# Patient Record
Sex: FEMALE
[De-identification: names, ages, dates, MRNs, and addresses within clinical notes are randomized per-mention and may not be internally consistent; named-entity substitution may affect disease eponyms.]

---

## 2023-04-05 ENCOUNTER — RESULT REVIEW (OUTPATIENT)
Age: 68
End: 2023-04-05

## 2023-04-05 ENCOUNTER — APPOINTMENT (OUTPATIENT)
Dept: ORTHOPEDIC SURGERY | Facility: CLINIC | Age: 68
End: 2023-04-05
Payer: MEDICARE

## 2023-04-05 VITALS
OXYGEN SATURATION: 98 % | SYSTOLIC BLOOD PRESSURE: 155 MMHG | WEIGHT: 109 LBS | BODY MASS INDEX: 20.32 KG/M2 | TEMPERATURE: 97.2 F | DIASTOLIC BLOOD PRESSURE: 97 MMHG | HEIGHT: 61.5 IN | HEART RATE: 85 BPM | RESPIRATION RATE: 18 BRPM

## 2023-04-05 DIAGNOSIS — M25.462 EFFUSION, LEFT KNEE: ICD-10-CM

## 2023-04-05 DIAGNOSIS — Z78.9 OTHER SPECIFIED HEALTH STATUS: ICD-10-CM

## 2023-04-05 DIAGNOSIS — Z82.5 FAMILY HISTORY OF ASTHMA AND OTHER CHRONIC LOWER RESPIRATORY DISEASES: ICD-10-CM

## 2023-04-05 DIAGNOSIS — Z80.8 FAMILY HISTORY OF MALIGNANT NEOPLASM OF OTHER ORGANS OR SYSTEMS: ICD-10-CM

## 2023-04-05 DIAGNOSIS — Z87.39 PERSONAL HISTORY OF OTHER DISEASES OF THE MUSCULOSKELETAL SYSTEM AND CONNECTIVE TISSUE: ICD-10-CM

## 2023-04-05 PROBLEM — Z00.00 ENCOUNTER FOR PREVENTIVE HEALTH EXAMINATION: Status: ACTIVE | Noted: 2023-04-05

## 2023-04-05 PROCEDURE — 99204 OFFICE O/P NEW MOD 45 MIN: CPT | Mod: 25

## 2023-04-05 PROCEDURE — 20610 DRAIN/INJ JOINT/BURSA W/O US: CPT | Mod: LT

## 2023-04-05 NOTE — HISTORY OF PRESENT ILLNESS
[de-identified] : Romy is a 67 year old female presenting for an evaluation of the Left knee pain with effusion for 1 day w/o injury. Normal knee prior to date. Pain is diffused, not able to bend, bear weight. She had a sx in 1983 on her left leg, had a pain in her foot, worried about Ca, states she does not have feeling in the leg therefore does not feel the pain. Her left knee surgery was for what she thought was a Hamartoma and they removed a significant portion of her medial quadriceps which has left her weaker on that leg. She was here with her  for his knee evaluation but was limping severely and could not straighten her knee all the way and could not bend it more than 45 degrees. I offered her the option of draining her knee to see if I could give her some relief and then we scheduled xrays tomorrow when she had more time to investigate the problem.

## 2023-04-05 NOTE — PHYSICAL EXAM
[de-identified] : Left Knee/Lower Extremity: \par \par Skin: Long medial knee arthrotomy healed incision with a deficient vastus medialis muscle.\par No erythema, no ecchymosis, no abrasions, no scratches, no tattoos.  \par               \par \par Knee Joint Swelling: severe effusion.	\par \par Popliteal Swelling: None. 	\par \par Pre-Patella Bursa Swelling: None. \par \par Alignment: Neutral \par \par ROM Extension:10 degrees.   \par ROM Flexion:  45 degrees.   \par \par Knee Joint Line Tenderness: \par tender at medial joint line.   \par tender at the lateral joint line.   \par tender in the patellofemoral compartment. \par \par Soft Tissue Tenderness:  the whole knee was tender.\par \par Patella Compression Test: painful to touch.\par \par Patellofemoral Crepitus: None.   \par \par Patellofemoral Apprehension Testing: Painful to test anything.\par \par Patellofemoral Laxity: tense effusion so not testable.\par \par Medial Collateral Ligament Laxity: Good endpoint.                                                      \par \par Lateral Collateral Ligament Laxity:  Good endpoint. \par \par ACL Testing: Stable ACL. Good Endpoint after fluid drained.\par \par \par PCL Testing: 			\par Stable PCL. Good Endpoint after fluid drained.                                                              \par Posterior Drawer Test: Negative \par \par Motor Strength: after fluid removed.		\par Quadriceps strength is 4 out of 5 \par Hamstring strength is 5 out of 5 \par Ankle dorsiflexion strength is 5 out of 5 \par Ankle plantarflexion strength is 5 out of 5 \par \par Sensation: 		\par Light tough sensation around the knee was decreased in several areas from previous surgery.\par \par Pulses: 				\par Pulses are palpable at the ankle at the Dorsalis Pedis Artery.  \par Pulses are palpable at the Posterior Tibialis Artery. \par \par \par \par Gait:  Very bad limping gait before fluid drained. improved limping gait after fluid removed.\par \par Assistive Devices: 	None \par  [de-identified] : None taken today. Patient will return tomorrow for xrays. She had no time today.

## 2023-04-05 NOTE — PROCEDURE
[de-identified] : Knee Joint Aspiration, Left: Verbal consent was obtained from the patient for a knee aspiration after the risks and benefits were discussed. The patient was made comfortable and the patient’s right knee was then sterilely prepped. Local anesthetic was used to desensitize the skin prior to the aspiration. With the skin anesthetized, an 18-gauge needle was introduced through a superior lateral approach to the knee joint just above the level of the patella. The knee was then aspirated. A sterile band-aid was placed over the aspiration site and pressure was applied to the injection site for several minutes to help with hemostasis as the needle site.\par \par 50 ml of clear joint fluid ) was removed from the knee joint into the syringe.\par \par

## 2023-04-05 NOTE — REASON FOR VISIT
[Initial Visit] : an initial visit for [FreeTextEntry2] : Left knee Pain and swelling and having difficulty walking.

## 2023-04-05 NOTE — DISCUSSION/SUMMARY
[de-identified] : Impression:\par Left knee effusion severe with difficulty walking and with motion.\par Left knee aspirated 50cc of clear yellow joint fluid with significant pain relief\par No xrays today.\par History of Hamartoma removal in 1980s with removal of vastus medialis muscle partially.\par \par Plan:\par She will return tomorrow for knee xrays to see if this is more of an arthritic cause to her effusion.\par If these give no reason, then advanced imaging may be considered.

## 2023-04-10 ENCOUNTER — APPOINTMENT (OUTPATIENT)
Dept: ORTHOPEDIC SURGERY | Facility: CLINIC | Age: 68
End: 2023-04-10
Payer: MEDICARE

## 2023-04-10 ENCOUNTER — TRANSCRIPTION ENCOUNTER (OUTPATIENT)
Age: 68
End: 2023-04-10

## 2023-04-10 ENCOUNTER — OUTPATIENT (OUTPATIENT)
Dept: OUTPATIENT SERVICES | Facility: HOSPITAL | Age: 68
LOS: 1 days | End: 2023-04-10
Payer: MEDICARE

## 2023-04-10 PROCEDURE — 77073 BONE LENGTH STUDIES: CPT

## 2023-04-10 PROCEDURE — 99212 OFFICE O/P EST SF 10 MIN: CPT

## 2023-04-10 PROCEDURE — 77073 BONE LENGTH STUDIES: CPT | Mod: 26

## 2023-04-10 PROCEDURE — 73564 X-RAY EXAM KNEE 4 OR MORE: CPT

## 2023-04-10 PROCEDURE — 73564 X-RAY EXAM KNEE 4 OR MORE: CPT | Mod: 26,50,59

## 2023-04-10 PROCEDURE — 73565 X-RAY EXAM OF KNEES: CPT

## 2023-04-10 NOTE — PHYSICAL EXAM
[de-identified] : Left Knee/Lower Extremity: \par \par Skin: Long medial knee arthrotomy healed incision with a deficient vastus medialis muscle.\par No erythema, no ecchymosis, no abrasions, no scratches, no tattoos.  \par               \par \par Knee Joint Swelling: minimal effusion.	\par \par Popliteal Swelling: None. 	\par \par Pre-Patella Bursa Swelling: None. \par \par Alignment: Neutral to mild valgus \par \par ROM Extension:4 degrees.   \par ROM Flexion:  110+ degrees.   \par \par Knee Joint Line Tenderness: \par tender at the lateral joint line.   \par \par Patellofemoral Crepitus: None.   \par \par Medial Collateral Ligament Laxity: Good endpoint.                                                      \par \par Lateral Collateral Ligament Laxity:  Good endpoint. \par \par ACL Testing: Stable ACL. Good Endpoint after fluid drained.\par \par \par PCL Testing: 			\par Stable PCL. Good Endpoint                                                        \par Posterior Drawer Test: Negative \par \par Sensation: 		\par Light tough sensation around the knee was decreased in several areas from previous surgery.\par \par Pulses: 				\par Pulses are palpable at the ankle at the Dorsalis Pedis Artery.  \par Pulses are palpable at the Posterior Tibialis Artery. \par \par Gait:  much improved since last visit with no cane\par \par Assistive Devices: 	None \par  [de-identified] : X-ray of the Left Knee:\par \par AP Standing View:\par Lateral joint space loss. mild with lateral osteophytes.\par Medial joint space preserved.\par \par PA Flexion View:\par Lateral joint space loss. Severe. Bone on Bone.\par Medial joint space preserved.\par \par Lateral View: \par Patellofemoral joint space preserved.\par \par [place] View: \par Patellofemoral joint space is preserved.\par Patellofemoral joint central tracking.\par Osteophytes seen on lateral femoral condyle\par \par Bilateral Hip to Ankle Standing View:\par Alignment: Valgus\par Lateral joint space loss. mild.\par Hips: No significant changes\par \par

## 2023-04-10 NOTE — DISCUSSION/SUMMARY
[de-identified] : Impression:\par Left knee effusion mild\par History of Hamartoma removal in 1980s with removal of vastus medialis muscle partially.\par Lateral compartment OA with severe joint space loss on the flexion view.\par \par Plan:\par We discussed the non surgical and surgical options today.\par She is fairly comfortable and not treatment was requested for right now.\par She understands she will need a partial or total knee replacement in the future but right now she is tolerating the knee arthrtis.

## 2023-04-10 NOTE — HISTORY OF PRESENT ILLNESS
[de-identified] : Romy is a 67 year old female presenting for a followup evaluation of the Left knee effusion. She reports after 50cc of clear fluid, was aspirated from the knee and states it feels better however had a reaction due to one of her medication intakes. \par She had xrays taken today because she did not have time last visit.\par We reviewed the xrays and discussed her knee condition.

## 2023-04-27 ENCOUNTER — APPOINTMENT (OUTPATIENT)
Dept: ORTHOPEDIC SURGERY | Facility: CLINIC | Age: 68
End: 2023-04-27
Payer: MEDICARE

## 2023-04-27 VITALS — HEIGHT: 61.5 IN | WEIGHT: 109 LBS | BODY MASS INDEX: 20.32 KG/M2 | RESPIRATION RATE: 16 BRPM

## 2023-04-27 DIAGNOSIS — M19.032 PRIMARY OSTEOARTHRITIS, LEFT WRIST: ICD-10-CM

## 2023-04-27 DIAGNOSIS — F48.8 OTHER SPECIFIED NONPSYCHOTIC MENTAL DISORDERS: ICD-10-CM

## 2023-04-27 PROCEDURE — 99214 OFFICE O/P EST MOD 30 MIN: CPT

## 2023-04-27 PROCEDURE — 73110 X-RAY EXAM OF WRIST: CPT | Mod: 50

## 2023-06-19 ENCOUNTER — APPOINTMENT (OUTPATIENT)
Dept: ORTHOPEDIC SURGERY | Facility: CLINIC | Age: 68
End: 2023-06-19
Payer: MEDICARE

## 2023-06-19 VITALS
HEART RATE: 64 BPM | HEIGHT: 61.5 IN | OXYGEN SATURATION: 100 % | SYSTOLIC BLOOD PRESSURE: 146 MMHG | DIASTOLIC BLOOD PRESSURE: 96 MMHG | WEIGHT: 109 LBS | RESPIRATION RATE: 18 BRPM | BODY MASS INDEX: 20.32 KG/M2

## 2023-06-19 DIAGNOSIS — M25.562 PAIN IN LEFT KNEE: ICD-10-CM

## 2023-06-19 PROCEDURE — 20610 DRAIN/INJ JOINT/BURSA W/O US: CPT | Mod: LT

## 2023-06-19 NOTE — HISTORY OF PRESENT ILLNESS
[de-identified] : Romy is a 67 year old female who presents today with left knee pain. She is here for an injection. She has left knee lateral compartment osteoarthritis that is moderate to severely worn down on the lateral side with osteophytes on her xrays and especially the flexion weight bearing view. She will be going to [facility] with her  for two months starting this coming weekend.

## 2023-06-19 NOTE — DISCUSSION/SUMMARY
[de-identified] : Impression:\par Left knee lateral compartment OA.\par History of Hamartoma removal in 1980s with removal of vastus medialis muscle partially.\par Lateral compartment OA with severe joint space loss on the flexion view.\par \par Plan:\par We discussed the non surgical option today.\par She is requesting a cortisone injection today.\par \par \par Knee Joint Cortisone Injection, Left: \par Verbal consent was obtained from the patient for a left knee injection after the risks and benefits were discussed. The patient was made comfortable in the seated position with the knee at 90 degrees hanging over the table. The patient’s knee injection site was then sterilely prepped. Local anesthetic was used to desensitize the skin prior to the injection. A sterile 22-guage needle on a sterile syringe was used to introduce the injectable liquid into the knee joint through an inferior parapatellar tendon approach. The needle was withdrawn, and a sterile band-aid was placed over the injection site. Pressure was applied to the injection site for several minutes to help with hemostasis at the injection site. The patient tolerated the procedure well.\par \par Medications injected into the knee:\par a. Steroid: Depomedrol 40mg.\par b. Local anesthetic: Lidocaine1%, Marcaine 0.25%\par \par

## 2023-06-19 NOTE — PHYSICAL EXAM
[de-identified] : Left Knee/Lower Extremity: \par \par Skin: Long medial knee arthrotomy healed incision with a deficient vastus medialis muscle.\par No erythema, no ecchymosis, no abrasions, no scratches, no tattoos.  \par               \par \par Knee Joint Swelling: minimal effusion.	\par \par Popliteal Swelling: None. 	\par \par Pre-Patella Bursa Swelling: None. \par \par Alignment: Neutral to mild valgus \par \par ROM Extension:2 degrees.   \par ROM Flexion:  110+ degrees.   \par \par Knee Joint Line Tenderness: \par tender at the lateral joint line.   \par \par Patellofemoral Crepitus: None.   \par \par Medial Collateral Ligament Laxity: Good endpoint.                                                      \par \par Lateral Collateral Ligament Laxity:  Good endpoint. \par \par ACL Testing: Stable ACL. Good Endpoint after fluid drained.\par \par \par PCL Testing: 			\par Stable PCL. Good Endpoint                                                        \par Posterior Drawer Test: Negative \par \par Sensation: 		\par Light tough sensation around the knee was decreased in several areas from previous surgery.\par \par Pulses: 				\par Pulses are palpable at the ankle at the Dorsalis Pedis Artery.  \par Pulses are palpable at the Posterior Tibialis Artery. \par \par Gait:  minimal limp unassisted now.\par \par Assistive Devices: 	None \par  [de-identified] : X-ray of the Left Knee:\par \par AP Standing View:\par Lateral joint space loss. mild with lateral osteophytes.\par Medial joint space preserved.\par \par PA Flexion View:\par Lateral joint space loss. Severe. Bone on Bone. with lateral osteophytes.\par Medial joint space preserved.\par \par Lateral View: \par Patellofemoral joint space preserved.\par \par [place] View: \par Patellofemoral joint space is preserved.\par Patellofemoral joint central tracking.\par Osteophytes seen on lateral femoral condyle\par \par Bilateral Hip to Ankle Standing View:\par Alignment: Valgus\par Lateral joint space loss. mild.\par Hips: No significant changes\par \par

## 2024-01-17 ENCOUNTER — OUTPATIENT (OUTPATIENT)
Dept: OUTPATIENT SERVICES | Facility: HOSPITAL | Age: 69
LOS: 1 days | End: 2024-01-17

## 2024-01-17 ENCOUNTER — APPOINTMENT (OUTPATIENT)
Dept: ORTHOPEDIC SURGERY | Facility: CLINIC | Age: 69
End: 2024-01-17
Payer: MEDICARE

## 2024-01-17 ENCOUNTER — APPOINTMENT (OUTPATIENT)
Dept: RADIOLOGY | Facility: CLINIC | Age: 69
End: 2024-01-17
Payer: MEDICARE

## 2024-01-17 VITALS
WEIGHT: 110 LBS | BODY MASS INDEX: 20.5 KG/M2 | HEIGHT: 61.5 IN | DIASTOLIC BLOOD PRESSURE: 79 MMHG | SYSTOLIC BLOOD PRESSURE: 129 MMHG | HEART RATE: 63 BPM | OXYGEN SATURATION: 98 %

## 2024-01-17 PROCEDURE — 77073 BONE LENGTH STUDIES: CPT | Mod: 26

## 2024-01-17 PROCEDURE — 73564 X-RAY EXAM KNEE 4 OR MORE: CPT | Mod: 26,50,59

## 2024-01-17 PROCEDURE — 99214 OFFICE O/P EST MOD 30 MIN: CPT | Mod: 25

## 2024-01-17 PROCEDURE — 20610 DRAIN/INJ JOINT/BURSA W/O US: CPT | Mod: LT

## 2024-01-17 NOTE — PHYSICAL EXAM
[de-identified] : Left Knee/Lower Extremity:   Skin: Long medial knee arthrotomy healed incision with a deficient vastus medialis muscle. No erythema, no ecchymosis, no abrasions, no scratches, no tattoos.                  Knee Joint Swelling: minimal effusion.	 Popliteal Swelling: None. 	 Pre-Patella Bursa Swelling: None.   Alignment: mild valgus  that does not correct much from its alignment.  ROM Extension:2 degrees.    ROM Flexion:  110+ degrees.     Knee Joint Line Tenderness:  tender at the lateral joint line.     Patellofemoral Crepitus: None.     Medial Collateral Ligament Laxity: Good endpoint.                                                       Lateral Collateral Ligament Laxity:  Good endpoint.   ACL Testing: Stable ACL. Good Endpoint after fluid drained.   PCL Testing: 			 Stable PCL. Good Endpoint                                                         Posterior Drawer Test: Negative   Sensation: 		 Light tough sensation around the knee was decreased in several areas from previous surgery.  Pulses: 				 Pulses are palpable at the ankle at the Dorsalis Pedis Artery.   Pulses are palpable at the Posterior Tibialis Artery.   Gait:  minimal limp now.  Assistive Devices: 	None   [de-identified] : X-ray of the Left Knee: Today's films  AP Standing View: Lateral joint space loss. mild with lateral osteophytes. Medial joint space preserved.  PA Flexion View: Lateral joint space loss. Severe. Bone on Bone. with lateral osteophytes. Medial joint space preserved.  Lateral View:  Patellofemoral joint space preserved.  [place] View:  Patellofemoral joint space is preserved. Patellofemoral joint central tracking. Osteophytes seen on lateral femoral condyle  Bilateral Hip to Ankle Standing View: Alignment: Valgus Lateral joint space loss. mild. Hips: No significant changes

## 2024-01-17 NOTE — PROCEDURE
[de-identified] : Knee Joint Cortisone Injection, Left:  Verbal consent was obtained from the patient for a left knee injection after the risks and benefits were discussed. The patient was made comfortable in the seated position with the knee at 90 degrees hanging over the table. The patients knee injection site was then sterilely prepped. Local anesthetic was used to desensitize the skin prior to the injection. A sterile 22-guage needle on a sterile syringe was used to introduce the injectable liquid into the knee joint through an inferior parapatellar tendon approach. The needle was withdrawn, and a sterile band-aid was placed over the injection site. Pressure was applied to the injection site for several minutes to help with hemostasis at the injection site. The patient tolerated the procedure well.  Medications injected into the knee:  a. Steroid: Celestone 6mg/ml, 1ml injected. b. Local anesthetic: Lidocaine1% 1ml, Marcaine 0.25% 1ml

## 2024-01-17 NOTE — DISCUSSION/SUMMARY
[de-identified] : Impression: left knee severe lateral compartment OA bone on bone flexion vew. Pain with weight bearing activity.  Plan: Injection today in left knee as per patient request. Patient tolerated the injection without pain. Will consider lateral PKA in the future, maybe in November after her commitments are done. Followup as needed.

## 2024-01-17 NOTE — HISTORY OF PRESENT ILLNESS
[de-identified] : Romy is a 68 year old female who presents today for a follow up visit, left knee lateral pain. She was last seen on 06.19.23 and had an injection. She states the injection gave her relief for some time and was able to hike. She describes pain now going down the stairs. She wants to discuss surgery some time this year. She has several engagements planned for the year and will be away in [place]. Therefore we will proceed with knee injection today in the left knee.

## 2024-01-22 ENCOUNTER — APPOINTMENT (OUTPATIENT)
Dept: OTOLARYNGOLOGY | Facility: CLINIC | Age: 69
End: 2024-01-22
Payer: MEDICARE

## 2024-01-22 VITALS — HEIGHT: 62 IN | WEIGHT: 109 LBS | BODY MASS INDEX: 20.06 KG/M2

## 2024-01-22 DIAGNOSIS — Z78.9 OTHER SPECIFIED HEALTH STATUS: ICD-10-CM

## 2024-01-22 DIAGNOSIS — H93.299 OTHER ABNORMAL AUDITORY PERCEPTIONS, UNSPECIFIED EAR: ICD-10-CM

## 2024-01-22 DIAGNOSIS — H90.3 SENSORINEURAL HEARING LOSS, BILATERAL: ICD-10-CM

## 2024-01-22 PROCEDURE — 99203 OFFICE O/P NEW LOW 30 MIN: CPT

## 2024-01-22 PROCEDURE — 92550 TYMPANOMETRY & REFLEX THRESH: CPT | Mod: 52

## 2024-01-22 PROCEDURE — 92557 COMPREHENSIVE HEARING TEST: CPT

## 2024-01-22 RX ORDER — THYROID 90 MG/1
TABLET ORAL
Refills: 0 | Status: ACTIVE | COMMUNITY

## 2024-01-22 RX ORDER — OXYBUTYNIN CHLORIDE 2.5 MG/1
TABLET ORAL
Refills: 0 | Status: ACTIVE | COMMUNITY

## 2024-01-22 NOTE — HISTORY OF PRESENT ILLNESS
[de-identified] :  JAMES SOSA has a history of progressive hearing loss for many years of uncertain origin.  No known ear disease or noises exposure.

## 2024-01-22 NOTE — ASSESSMENT
[FreeTextEntry1] : I have reviewed the audiometric findings in detail and the management options. I have recommended considering amplification for hearing loss and offered a referral to the [facility].

## 2024-01-22 NOTE — CONSULT LETTER
[Please see my note below.] : Please see my note below. [FreeTextEntry2] : Dear SELINA SINGH  [FreeTextEntry1] : Thank you for allowing me to participate in the care of JAMES SOSA . Please see the attached visit note.    Sam Hernandez Otology Medical Director of [facility] Department of Otolaryngology [facility]

## 2024-01-22 NOTE — DATA REVIEWED
[de-identified] : In light of the patients current symptoms, Complete audiometry was ordered and completed today. I have interpreted these results and reviewed them in detail with the patient.  Sloping bilateral sensorineural hearing loss.  Tympanometry is normal bilaterally.

## 2024-01-22 NOTE — REASON FOR VISIT
[Initial Consultation] : an initial consultation for [Hearing Loss] : hearing loss [FreeTextEntry2] : hearing test

## 2024-01-24 ENCOUNTER — APPOINTMENT (OUTPATIENT)
Dept: ULTRASOUND IMAGING | Facility: CLINIC | Age: 69
End: 2024-01-24
Payer: MEDICARE

## 2024-01-24 ENCOUNTER — OUTPATIENT (OUTPATIENT)
Dept: OUTPATIENT SERVICES | Facility: HOSPITAL | Age: 69
LOS: 1 days | End: 2024-01-24

## 2024-01-24 PROCEDURE — 76770 US EXAM ABDO BACK WALL COMP: CPT | Mod: 26

## 2024-02-07 ENCOUNTER — APPOINTMENT (OUTPATIENT)
Dept: OTOLARYNGOLOGY | Facility: CLINIC | Age: 69
End: 2024-02-07

## 2024-03-04 ENCOUNTER — APPOINTMENT (OUTPATIENT)
Dept: ORTHOPEDIC SURGERY | Facility: CLINIC | Age: 69
End: 2024-03-04
Payer: MEDICARE

## 2024-03-04 VITALS
BODY MASS INDEX: 20.06 KG/M2 | DIASTOLIC BLOOD PRESSURE: 76 MMHG | HEIGHT: 62 IN | HEART RATE: 83 BPM | WEIGHT: 109 LBS | OXYGEN SATURATION: 96 % | SYSTOLIC BLOOD PRESSURE: 118 MMHG

## 2024-03-04 PROCEDURE — 20610 DRAIN/INJ JOINT/BURSA W/O US: CPT | Mod: LT

## 2024-03-04 PROCEDURE — 99213 OFFICE O/P EST LOW 20 MIN: CPT | Mod: 25

## 2024-03-04 NOTE — DISCUSSION/SUMMARY
[de-identified] : Impression: left knee severe lateral compartment OA bone on bone flexion vew. Pain with weight bearing activity. Recent effusion that has resolved as of today. Would like relief of pain.  Plan: Injection today in left knee Patient tolerated the injection without pain. Will consider lateral PKA in the future, maybe in November  Followup as needed.

## 2024-03-04 NOTE — PROCEDURE
[de-identified] : Knee Joint Cortisone Injection, Left:  Verbal consent was obtained from the patient for a left knee injection after the risks and benefits were discussed. The patient was made comfortable in the seated position with the knee at 90 degrees hanging over the table. The patients knee injection site was then sterilely prepped. Local anesthetic was used to desensitize the skin prior to the injection. A sterile 22-guage needle on a sterile syringe was used to introduce the injectable liquid into the knee joint through an inferior parapatellar tendon approach. The needle was withdrawn, and a sterile band-aid was placed over the injection site. Pressure was applied to the injection site for several minutes to help with hemostasis at the injection site. The patient tolerated the procedure well.  Medications injected into the knee:  a. Steroid: Celestone 6mg/ml, 1ml injected. b. Local anesthetic: Lidocaine1% 1ml, Marcaine 0.25% 1ml

## 2024-03-04 NOTE — PHYSICAL EXAM
[de-identified] : X-ray of the Left Knee: Last time xrays were taken they showed:  AP Standing View: Lateral joint space loss. mild with lateral osteophytes. Medial joint space preserved.  PA Flexion View: Lateral joint space loss. Severe. Bone on Bone. with lateral osteophytes. Medial joint space preserved.  Lateral View:  Patellofemoral joint space preserved.  [place] View:  Patellofemoral joint space is preserved. Patellofemoral joint central tracking. Osteophytes seen on lateral femoral condyle  Bilateral Hip to Ankle Standing View: Alignment: Valgus Lateral joint space loss. mild. Hips: No significant changes   [de-identified] : Left Knee/Lower Extremity:   Skin: Long medial knee arthrotomy healed incision with a deficient vastus medialis muscle. No erythema, no ecchymosis, no abrasions, no scratches, no tattoos.                  Knee Joint Swelling: minimal effusion today but she says it was worse last week.	 Popliteal Swelling: None. 	 Pre-Patella Bursa Swelling: None.   Alignment: mild valgus that does not correct much from its alignment.  ROM Extension:2 degrees.    ROM Flexion:  110+ degrees.     Knee Joint Line Tenderness:  tender at the lateral joint line.     Patellofemoral Crepitus: None.     Medial Collateral Ligament Laxity: Good endpoint.                                                       Lateral Collateral Ligament Laxity:  Good endpoint.   ACL Testing: Stable ACL. Good Endpoint   PCL Testing: 			 Stable PCL. Good Endpoint                                                         Posterior Drawer Test: Negative   Sensation: 		 Light tough sensation around the knee was decreased in several areas from previous surgery.  Pulses: 				 Pulses are palpable at the ankle at the Dorsalis Pedis Artery.   Pulses are palpable at the Posterior Tibialis Artery.   Gait:  minimal limp .  Assistive Devices: 	None

## 2024-03-04 NOTE — HISTORY OF PRESENT ILLNESS
[de-identified] : Romy is a 68 year old female who presents today with left knee pain, swelling and fluid again. She was last seen 1.17.24 and received a cortisone injection. The swelling was worse last week and now is only mild but it does hurt with activity and weight bearig.No recent trauma. No fever, chills, N/V.

## 2024-04-29 ENCOUNTER — APPOINTMENT (OUTPATIENT)
Dept: ORTHOPEDIC SURGERY | Facility: CLINIC | Age: 69
End: 2024-04-29
Payer: MEDICARE

## 2024-04-29 VITALS
BODY MASS INDEX: 20.06 KG/M2 | DIASTOLIC BLOOD PRESSURE: 82 MMHG | WEIGHT: 109 LBS | SYSTOLIC BLOOD PRESSURE: 122 MMHG | OXYGEN SATURATION: 95 % | HEIGHT: 62 IN | HEART RATE: 85 BPM

## 2024-04-29 DIAGNOSIS — M25.562 PAIN IN LEFT KNEE: ICD-10-CM

## 2024-04-29 DIAGNOSIS — G89.29 PAIN IN LEFT KNEE: ICD-10-CM

## 2024-04-29 DIAGNOSIS — M23.201 DERANGEMENT OF UNSPECIFIED LATERAL MENISCUS DUE TO OLD TEAR OR INJURY, LEFT KNEE: ICD-10-CM

## 2024-04-29 DIAGNOSIS — M17.32 UNILATERAL POST-TRAUMATIC OSTEOARTHRITIS, LEFT KNEE: ICD-10-CM

## 2024-04-29 PROCEDURE — 99214 OFFICE O/P EST MOD 30 MIN: CPT

## 2024-04-29 NOTE — PHYSICAL EXAM
[de-identified] : Left Knee/Lower Extremity:   Skin: Long medial knee arthrotomy healed incision with a deficient vastus medialis muscle. No erythema, no ecchymosis, no abrasions, no scratches, no tattoos.                  Knee Joint Swelling: minimal effusion today 	 Popliteal Swelling: None. 	 Pre-Patella Bursa Swelling: None.   Alignment: mild valgus that does not correct much from its alignment.  ROM Extension:2 degrees.    ROM Flexion:  110+ degrees.     Knee Joint Line Tenderness:  tender at the lateral joint line with palpable clicking and popping of the meniscus.  Patellofemoral Crepitus: None.     Medial Collateral Ligament Laxity: Good endpoint.                                                       Lateral Collateral Ligament Laxity:  Good endpoint.   ACL Testing: Stable ACL. Good Endpoint   PCL Testing: 			 Stable PCL. Good Endpoint                                                         Posterior Drawer Test: Negative   Sensation: 		 Light tough sensation around the knee was decreased in several areas from previous surgery.  Pulses: 				 Pulses are palpable at the ankle at the Dorsalis Pedis Artery.   Pulses are palpable at the Posterior Tibialis Artery.   Gait:  minimal limp .  Assistive Devices: 	None

## 2024-04-29 NOTE — HISTORY OF PRESENT ILLNESS
[de-identified] : Romy is a 68-year-old female who presents today with left knee pain. She was last seen 3.04.24 and received another cortisone injection. She states she has swelling and a lot of clicking going in and out of the car. Her knee locks occasionally and catches when bending. She has a palpable popping on the lateral meniscus. She is bone on bone on the lateral compartment in the flexion view. She was wanting to know what else she can do until December when she is thinking about having a lateral partial knee. Her  is with her and his knee is doing great now.

## 2024-04-29 NOTE — DISCUSSION/SUMMARY
[de-identified] : Impression: left knee severe lateral compartment OA bone on bone flexion view. Pain with weight bearing activity. clicking and popping and locking occasionally on the lateral side.   Plan: The previous Injection in left knee  helped for about a month. But the pain is back. Will consider lateral PKA in December. She may come back before one of her up coming trips to get another injection. Its a little too early for another just yet.

## 2024-09-13 ENCOUNTER — APPOINTMENT (OUTPATIENT)
Dept: MRI IMAGING | Facility: CLINIC | Age: 69
End: 2024-09-13

## 2024-09-13 ENCOUNTER — OUTPATIENT (OUTPATIENT)
Dept: OUTPATIENT SERVICES | Facility: HOSPITAL | Age: 69
LOS: 1 days | End: 2024-09-13

## 2024-09-13 PROCEDURE — 72148 MRI LUMBAR SPINE W/O DYE: CPT | Mod: 26,MH

## 2024-10-02 ENCOUNTER — APPOINTMENT (OUTPATIENT)
Dept: PHYSICAL MEDICINE AND REHAB | Facility: CLINIC | Age: 69
End: 2024-10-02
Payer: MEDICARE

## 2024-10-02 DIAGNOSIS — M48.061 SPINAL STENOSIS, LUMBAR REGION WITHOUT NEUROGENIC CLAUDICATION: ICD-10-CM

## 2024-10-02 DIAGNOSIS — M47.816 SPONDYLOSIS W/OUT MYELOPATHY OR RADICULOPATHY, LUMBAR REGION: ICD-10-CM

## 2024-10-02 PROCEDURE — 99204 OFFICE O/P NEW MOD 45 MIN: CPT

## 2024-10-02 RX ORDER — GABAPENTIN 100 MG/1
100 CAPSULE ORAL
Qty: 60 | Refills: 0 | Status: ACTIVE | COMMUNITY
Start: 2024-10-02 | End: 1900-01-01

## 2024-10-02 NOTE — ASSESSMENT
[FreeTextEntry1] : MRI shows L5/S1 and L4/5 NF stenosis and facet oa. 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  Told to monitor for increased size or swelling, signs of infection.   Discussed diagnosis and treatment plan including PT. Discussed facet injections.  She understands risk of immunosuppression from steroids.  She elects to do it since has a trip.  Right L4/5 facet at 30 deg, L5/S1 at 10 deg.   avoid back ext continue pilates as tolerated warned of sedation with gabapentin  Talked to PCP about plan.

## 2024-10-02 NOTE — HISTORY OF PRESENT ILLNESS
[FreeTextEntry1] : Location: back Severity: mild now but severe at times Duration: years Context:  Aggravating Factors: walking Alleviating Factors: rest Associated Symptoms: denies weight loss, fever, chills, change in bowel/bladder habits, weakness, numbness/tingling, +radiation down to right thigh Prior Studies: MRI

## 2024-10-02 NOTE — PHYSICAL EXAM
[FreeTextEntry1] : JAMES is a 68 year old female  Constitutional: healthy appearing, NAD, and normal body habitus  LUMBAR ROM: flexion to 30 deg, ext to 5 deg with pain  Gait: normal  Inspection: no erythema, warmth Spine: no TTP in spinous process Bony palpation: no TTP in GT  Soft tissue palpation hip: no TTP in gluteus yesica Soft tissue palpation of spine: no TTP in lumbar paraspinals  5/5 bilateral KE, DF, PF  sensation intact in bilat LE reflexes: knee and ankle   Special tests: neg seated SLR

## 2024-10-03 ENCOUNTER — APPOINTMENT (OUTPATIENT)
Dept: ORTHOPEDIC SURGERY | Facility: CLINIC | Age: 69
End: 2024-10-03

## 2024-10-07 ENCOUNTER — APPOINTMENT (OUTPATIENT)
Dept: PHYSICAL MEDICINE AND REHAB | Facility: CLINIC | Age: 69
End: 2024-10-07

## 2024-11-22 ENCOUNTER — APPOINTMENT (OUTPATIENT)
Dept: RADIOLOGY | Facility: CLINIC | Age: 69
End: 2024-11-22
Payer: MEDICARE

## 2024-11-22 ENCOUNTER — OUTPATIENT (OUTPATIENT)
Dept: OUTPATIENT SERVICES | Facility: HOSPITAL | Age: 69
LOS: 1 days | End: 2024-11-22

## 2024-11-22 PROCEDURE — 71046 X-RAY EXAM CHEST 2 VIEWS: CPT | Mod: 26

## 2024-12-02 ENCOUNTER — OUTPATIENT (OUTPATIENT)
Dept: OUTPATIENT SERVICES | Facility: HOSPITAL | Age: 69
LOS: 1 days | End: 2024-12-02

## 2024-12-02 ENCOUNTER — APPOINTMENT (OUTPATIENT)
Dept: ULTRASOUND IMAGING | Facility: CLINIC | Age: 69
End: 2024-12-02
Payer: MEDICARE

## 2024-12-02 PROCEDURE — 76830 TRANSVAGINAL US NON-OB: CPT | Mod: 26

## 2024-12-02 PROCEDURE — 76856 US EXAM PELVIC COMPLETE: CPT | Mod: 26

## 2024-12-31 DIAGNOSIS — M17.12 UNILATERAL PRIMARY OSTEOARTHRITIS, LEFT KNEE: ICD-10-CM

## 2025-02-10 ENCOUNTER — APPOINTMENT (OUTPATIENT)
Dept: RADIOLOGY | Facility: CLINIC | Age: 70
End: 2025-02-10

## 2025-02-10 ENCOUNTER — APPOINTMENT (OUTPATIENT)
Dept: ORTHOPEDIC SURGERY | Facility: CLINIC | Age: 70
End: 2025-02-10
Payer: MEDICARE

## 2025-02-10 ENCOUNTER — OUTPATIENT (OUTPATIENT)
Dept: OUTPATIENT SERVICES | Facility: HOSPITAL | Age: 70
LOS: 1 days | End: 2025-02-10
Payer: MEDICARE

## 2025-02-10 VITALS
HEIGHT: 62 IN | BODY MASS INDEX: 20.06 KG/M2 | OXYGEN SATURATION: 94 % | WEIGHT: 109 LBS | HEART RATE: 71 BPM | DIASTOLIC BLOOD PRESSURE: 94 MMHG | SYSTOLIC BLOOD PRESSURE: 149 MMHG

## 2025-02-10 DIAGNOSIS — G89.29 PAIN IN LEFT KNEE: ICD-10-CM

## 2025-02-10 DIAGNOSIS — Z96.652 PRESENCE OF LEFT ARTIFICIAL KNEE JOINT: ICD-10-CM

## 2025-02-10 DIAGNOSIS — Z96.652 AFTERCARE FOLLOWING JOINT REPLACEMENT SURGERY: ICD-10-CM

## 2025-02-10 DIAGNOSIS — M25.462 EFFUSION, LEFT KNEE: ICD-10-CM

## 2025-02-10 DIAGNOSIS — Z22.322 CARRIER OR SUSPECTED CARRIER OF METHICILLIN RESISTANT STAPHYLOCOCCUS AUREUS: ICD-10-CM

## 2025-02-10 DIAGNOSIS — Z47.1 AFTERCARE FOLLOWING JOINT REPLACEMENT SURGERY: ICD-10-CM

## 2025-02-10 DIAGNOSIS — M25.562 PAIN IN LEFT KNEE: ICD-10-CM

## 2025-02-10 DIAGNOSIS — M17.32 UNILATERAL POST-TRAUMATIC OSTEOARTHRITIS, LEFT KNEE: ICD-10-CM

## 2025-02-10 PROCEDURE — 77073 BONE LENGTH STUDIES: CPT | Mod: 26

## 2025-02-10 PROCEDURE — 73564 X-RAY EXAM KNEE 4 OR MORE: CPT

## 2025-02-10 PROCEDURE — 77073 BONE LENGTH STUDIES: CPT

## 2025-02-10 PROCEDURE — 99215 OFFICE O/P EST HI 40 MIN: CPT

## 2025-02-10 PROCEDURE — 73564 X-RAY EXAM KNEE 4 OR MORE: CPT | Mod: 26,50,XU

## 2025-02-13 PROBLEM — Z96.652 ARTIFICIAL KNEE JOINT PRESENT, LEFT: Status: ACTIVE | Noted: 2025-02-13

## 2025-02-13 PROBLEM — Z22.322 METHICILLIN RESISTANT STAPHYLOCOCCUS AUREUS CARRIER/SUSPECTED CARRIER: Status: ACTIVE | Noted: 2025-02-13

## 2025-02-13 PROBLEM — Z47.1 AFTERCARE FOLLOWING LEFT KNEE JOINT REPLACEMENT SURGERY: Status: ACTIVE | Noted: 2025-02-13

## 2025-02-18 ENCOUNTER — NON-APPOINTMENT (OUTPATIENT)
Age: 70
End: 2025-02-18

## 2025-03-12 ENCOUNTER — OUTPATIENT (OUTPATIENT)
Dept: OUTPATIENT SERVICES | Facility: HOSPITAL | Age: 70
LOS: 1 days | End: 2025-03-12
Payer: MEDICARE

## 2025-03-12 ENCOUNTER — NON-APPOINTMENT (OUTPATIENT)
Age: 70
End: 2025-03-12

## 2025-03-12 ENCOUNTER — APPOINTMENT (OUTPATIENT)
Dept: CT IMAGING | Facility: CLINIC | Age: 70
End: 2025-03-12

## 2025-03-12 ENCOUNTER — APPOINTMENT (OUTPATIENT)
Dept: ORTHOPEDIC SURGERY | Facility: CLINIC | Age: 70
End: 2025-03-12
Payer: MEDICARE

## 2025-03-12 DIAGNOSIS — M17.12 UNILATERAL PRIMARY OSTEOARTHRITIS, LEFT KNEE: ICD-10-CM

## 2025-03-12 DIAGNOSIS — R79.1 ABNORMAL COAGULATION PROFILE: ICD-10-CM

## 2025-03-12 DIAGNOSIS — Z22.322 CARRIER OR SUSPECTED CARRIER OF METHICILLIN RESISTANT STAPHYLOCOCCUS AUREUS: ICD-10-CM

## 2025-03-12 DIAGNOSIS — M25.562 PAIN IN LEFT KNEE: ICD-10-CM

## 2025-03-12 DIAGNOSIS — G89.29 PAIN IN LEFT KNEE: ICD-10-CM

## 2025-03-12 LAB
ALBUMIN SERPL ELPH-MCNC: 4.1 G/DL
ALP BLD-CCNC: 40 U/L
ALT SERPL-CCNC: 30 U/L
ANION GAP SERPL CALC-SCNC: 6 MMOL/L
APTT BLD: 31.9 SEC
AST SERPL-CCNC: 25 U/L
BILIRUB SERPL-MCNC: 0.2 MG/DL
BUN SERPL-MCNC: 12 MG/DL
CALCIUM SERPL-MCNC: 9.3 MG/DL
CHLORIDE SERPL-SCNC: 98 MMOL/L
CO2 SERPL-SCNC: 32 MMOL/L
CREAT SERPL-MCNC: 0.75 MG/DL
EGFRCR SERPLBLD CKD-EPI 2021: 85 ML/MIN/1.73M2
GLUCOSE SERPL-MCNC: 102 MG/DL
POTASSIUM SERPL-SCNC: 4.2 MMOL/L
PROT SERPL-MCNC: 7.2 G/DL
SODIUM SERPL-SCNC: 136 MMOL/L

## 2025-03-12 PROCEDURE — 73700 CT LOWER EXTREMITY W/O DYE: CPT

## 2025-03-12 PROCEDURE — 99214 OFFICE O/P EST MOD 30 MIN: CPT

## 2025-03-12 PROCEDURE — 73700 CT LOWER EXTREMITY W/O DYE: CPT | Mod: 26,LT

## 2025-03-12 RX ORDER — CEFADROXIL 500 MG/1
500 CAPSULE ORAL TWICE DAILY
Qty: 14 | Refills: 1 | Status: ACTIVE | COMMUNITY
Start: 2025-03-12 | End: 1900-01-01

## 2025-03-12 RX ORDER — CELECOXIB 200 MG/1
200 CAPSULE ORAL
Qty: 60 | Refills: 2 | Status: ACTIVE | COMMUNITY
Start: 2025-03-12 | End: 1900-01-01

## 2025-03-12 RX ORDER — HYDROCODONE BITARTRATE AND ACETAMINOPHEN 7.5; 325 MG/1; MG/1
7.5-325 TABLET ORAL
Qty: 24 | Refills: 0 | Status: ACTIVE | COMMUNITY
Start: 2025-03-12 | End: 1900-01-01

## 2025-03-13 LAB
INR PPP: 0.92
MRSA SPEC QL CULT: NOT DETECTED
PT BLD: 10.8 SEC
STAPH AUREUS (SA): NOT DETECTED

## 2025-03-14 ENCOUNTER — NON-APPOINTMENT (OUTPATIENT)
Age: 70
End: 2025-03-14

## 2025-03-18 ENCOUNTER — APPOINTMENT (OUTPATIENT)
Age: 70
End: 2025-03-18

## 2025-03-18 PROCEDURE — 27446 REVISION OF KNEE JOINT: CPT | Mod: LT

## 2025-03-19 ENCOUNTER — OUTPATIENT (OUTPATIENT)
Dept: OUTPATIENT SERVICES | Facility: HOSPITAL | Age: 70
LOS: 1 days | End: 2025-03-19
Payer: MEDICARE

## 2025-03-19 ENCOUNTER — APPOINTMENT (OUTPATIENT)
Dept: RADIOLOGY | Facility: CLINIC | Age: 70
End: 2025-03-19

## 2025-03-19 ENCOUNTER — APPOINTMENT (OUTPATIENT)
Dept: ORTHOPEDIC SURGERY | Facility: CLINIC | Age: 70
End: 2025-03-19
Payer: MEDICARE

## 2025-03-19 VITALS
WEIGHT: 109 LBS | SYSTOLIC BLOOD PRESSURE: 121 MMHG | BODY MASS INDEX: 20.06 KG/M2 | DIASTOLIC BLOOD PRESSURE: 77 MMHG | HEART RATE: 72 BPM | HEIGHT: 62 IN | OXYGEN SATURATION: 99 %

## 2025-03-19 DIAGNOSIS — Z96.652 PRESENCE OF LEFT ARTIFICIAL KNEE JOINT: ICD-10-CM

## 2025-03-19 DIAGNOSIS — Z47.1 AFTERCARE FOLLOWING JOINT REPLACEMENT SURGERY: ICD-10-CM

## 2025-03-19 DIAGNOSIS — Z96.652 AFTERCARE FOLLOWING JOINT REPLACEMENT SURGERY: ICD-10-CM

## 2025-03-19 PROCEDURE — 99024 POSTOP FOLLOW-UP VISIT: CPT

## 2025-03-19 PROCEDURE — 77073 BONE LENGTH STUDIES: CPT

## 2025-03-19 PROCEDURE — 77073 BONE LENGTH STUDIES: CPT | Mod: 26

## 2025-03-19 PROCEDURE — 73562 X-RAY EXAM OF KNEE 3: CPT | Mod: 26,LT

## 2025-03-19 PROCEDURE — 73562 X-RAY EXAM OF KNEE 3: CPT

## 2025-03-25 ENCOUNTER — NON-APPOINTMENT (OUTPATIENT)
Age: 70
End: 2025-03-25

## 2025-04-08 DIAGNOSIS — Z79.2 LONG TERM (CURRENT) USE OF ANTIBIOTICS: ICD-10-CM

## 2025-04-08 RX ORDER — CLINDAMYCIN HYDROCHLORIDE 300 MG/1
300 CAPSULE ORAL
Qty: 20 | Refills: 1 | Status: ACTIVE | COMMUNITY
Start: 2025-04-08 | End: 1900-01-01

## 2025-04-09 ENCOUNTER — APPOINTMENT (OUTPATIENT)
Dept: ORTHOPEDIC SURGERY | Facility: CLINIC | Age: 70
End: 2025-04-09
Payer: MEDICARE

## 2025-04-09 VITALS
WEIGHT: 109 LBS | DIASTOLIC BLOOD PRESSURE: 75 MMHG | HEART RATE: 80 BPM | HEIGHT: 62 IN | OXYGEN SATURATION: 98 % | BODY MASS INDEX: 20.06 KG/M2 | SYSTOLIC BLOOD PRESSURE: 121 MMHG

## 2025-04-09 DIAGNOSIS — Z47.1 AFTERCARE FOLLOWING JOINT REPLACEMENT SURGERY: ICD-10-CM

## 2025-04-09 DIAGNOSIS — Z96.652 AFTERCARE FOLLOWING JOINT REPLACEMENT SURGERY: ICD-10-CM

## 2025-04-09 DIAGNOSIS — Z96.652 PRESENCE OF LEFT ARTIFICIAL KNEE JOINT: ICD-10-CM

## 2025-04-09 PROCEDURE — 99024 POSTOP FOLLOW-UP VISIT: CPT

## 2025-04-29 ENCOUNTER — NON-APPOINTMENT (OUTPATIENT)
Age: 70
End: 2025-04-29

## 2025-05-21 ENCOUNTER — APPOINTMENT (OUTPATIENT)
Dept: UROLOGY | Facility: CLINIC | Age: 70
End: 2025-05-21
Payer: MEDICARE

## 2025-05-21 ENCOUNTER — APPOINTMENT (OUTPATIENT)
Dept: RADIOLOGY | Facility: CLINIC | Age: 70
End: 2025-05-21

## 2025-05-21 ENCOUNTER — NON-APPOINTMENT (OUTPATIENT)
Age: 70
End: 2025-05-21

## 2025-05-21 ENCOUNTER — OUTPATIENT (OUTPATIENT)
Dept: OUTPATIENT SERVICES | Facility: HOSPITAL | Age: 70
LOS: 1 days | End: 2025-05-21
Payer: MEDICARE

## 2025-05-21 ENCOUNTER — APPOINTMENT (OUTPATIENT)
Dept: ORTHOPEDIC SURGERY | Facility: CLINIC | Age: 70
End: 2025-05-21
Payer: MEDICARE

## 2025-05-21 VITALS
DIASTOLIC BLOOD PRESSURE: 82 MMHG | HEART RATE: 79 BPM | TEMPERATURE: 97.7 F | OXYGEN SATURATION: 99 % | SYSTOLIC BLOOD PRESSURE: 124 MMHG

## 2025-05-21 VITALS
HEART RATE: 64 BPM | SYSTOLIC BLOOD PRESSURE: 142 MMHG | DIASTOLIC BLOOD PRESSURE: 86 MMHG | TEMPERATURE: 97.6 F | OXYGEN SATURATION: 98 %

## 2025-05-21 VITALS
DIASTOLIC BLOOD PRESSURE: 73 MMHG | OXYGEN SATURATION: 98 % | BODY MASS INDEX: 20.06 KG/M2 | HEIGHT: 62 IN | WEIGHT: 109 LBS | HEART RATE: 84 BPM | SYSTOLIC BLOOD PRESSURE: 112 MMHG

## 2025-05-21 DIAGNOSIS — R39.9 UNSPECIFIED SYMPTOMS AND SIGNS INVOLVING THE GENITOURINARY SYSTEM: ICD-10-CM

## 2025-05-21 DIAGNOSIS — Z47.1 AFTERCARE FOLLOWING JOINT REPLACEMENT SURGERY: ICD-10-CM

## 2025-05-21 DIAGNOSIS — Z96.652 PRESENCE OF LEFT ARTIFICIAL KNEE JOINT: ICD-10-CM

## 2025-05-21 DIAGNOSIS — Z96.652 AFTERCARE FOLLOWING JOINT REPLACEMENT SURGERY: ICD-10-CM

## 2025-05-21 PROCEDURE — 77073 BONE LENGTH STUDIES: CPT | Mod: 26

## 2025-05-21 PROCEDURE — 77073 BONE LENGTH STUDIES: CPT

## 2025-05-21 PROCEDURE — 73562 X-RAY EXAM OF KNEE 3: CPT

## 2025-05-21 PROCEDURE — 73562 X-RAY EXAM OF KNEE 3: CPT | Mod: 26,LT

## 2025-05-21 PROCEDURE — 99204 OFFICE O/P NEW MOD 45 MIN: CPT

## 2025-05-21 PROCEDURE — 99459 PELVIC EXAMINATION: CPT

## 2025-05-21 PROCEDURE — 99024 POSTOP FOLLOW-UP VISIT: CPT

## 2025-05-22 LAB
APPEARANCE: CLEAR
BACTERIA: NEGATIVE /HPF
BILIRUB UR QL STRIP: NORMAL
BILIRUBIN URINE: NEGATIVE
BLOOD URINE: NEGATIVE
CAST: 0 /LPF
CLARITY UR: CLEAR
COLLECTION METHOD: NORMAL
COLOR: YELLOW
EPITHELIAL CELLS: 0 /HPF
GLUCOSE QUALITATIVE U: NEGATIVE MG/DL
GLUCOSE UR-MCNC: NORMAL
HCG UR QL: 0.2 EU/DL
HGB UR QL STRIP.AUTO: NORMAL
KETONES UR-MCNC: NORMAL
KETONES URINE: NEGATIVE MG/DL
LEUKOCYTE ESTERASE UR QL STRIP: NORMAL
LEUKOCYTE ESTERASE URINE: ABNORMAL
MICROSCOPIC-UA: NORMAL
NITRITE UR QL STRIP: NORMAL
NITRITE URINE: NEGATIVE
PH UR STRIP: 8
PH URINE: 8
PROT UR STRIP-MCNC: NORMAL
PROTEIN URINE: NEGATIVE MG/DL
RED BLOOD CELLS URINE: 10 /HPF
REVIEW: NORMAL
SP GR UR STRIP: 1.01
SPECIFIC GRAVITY URINE: 1.01
UROBILINOGEN URINE: 0.2 MG/DL
WHITE BLOOD CELLS URINE: 0 /HPF

## 2025-05-27 ENCOUNTER — NON-APPOINTMENT (OUTPATIENT)
Age: 70
End: 2025-05-27

## 2025-05-27 LAB — BACTERIA UR CULT: ABNORMAL

## 2025-05-27 RX ORDER — CIPROFLOXACIN HYDROCHLORIDE 500 MG/1
500 TABLET, FILM COATED ORAL
Qty: 6 | Refills: 0 | Status: ACTIVE | COMMUNITY
Start: 2025-05-27 | End: 1900-01-01

## 2025-06-04 ENCOUNTER — NON-APPOINTMENT (OUTPATIENT)
Age: 70
End: 2025-06-04

## 2025-06-04 DIAGNOSIS — R31.21 ASYMPTOMATIC MICROSCOPIC HEMATURIA: ICD-10-CM

## 2025-06-04 LAB
APPEARANCE: CLEAR
BACTERIA: NEGATIVE /HPF
BILIRUBIN URINE: NEGATIVE
BLOOD URINE: NEGATIVE
CAST: 0 /LPF
COLOR: YELLOW
EPITHELIAL CELLS: 1 /HPF
GLUCOSE QUALITATIVE U: NEGATIVE MG/DL
KETONES URINE: NEGATIVE MG/DL
LEUKOCYTE ESTERASE URINE: NEGATIVE
MICROSCOPIC-UA: NORMAL
NITRITE URINE: NEGATIVE
PH URINE: 7.5
PROTEIN URINE: NEGATIVE MG/DL
RED BLOOD CELLS URINE: 11 /HPF
REVIEW: NORMAL
SPECIFIC GRAVITY URINE: 1.01
UROBILINOGEN URINE: 0.2 MG/DL
WHITE BLOOD CELLS URINE: 0 /HPF

## 2025-06-05 ENCOUNTER — NON-APPOINTMENT (OUTPATIENT)
Age: 70
End: 2025-06-05

## 2025-06-05 ENCOUNTER — APPOINTMENT (OUTPATIENT)
Dept: UROLOGY | Facility: CLINIC | Age: 70
End: 2025-06-05

## 2025-06-05 VITALS — DIASTOLIC BLOOD PRESSURE: 61 MMHG | OXYGEN SATURATION: 100 % | HEART RATE: 83 BPM | SYSTOLIC BLOOD PRESSURE: 132 MMHG

## 2025-06-05 LAB
BILIRUB UR QL STRIP: NORMAL
CLARITY UR: CLEAR
COLLECTION METHOD: NORMAL
GLUCOSE UR-MCNC: NORMAL
HCG UR QL: 0.2 EU/DL
HGB UR QL STRIP.AUTO: NORMAL
KETONES UR-MCNC: NORMAL
LEUKOCYTE ESTERASE UR QL STRIP: NORMAL
NITRITE UR QL STRIP: NORMAL
PH UR STRIP: 7.5
PROT UR STRIP-MCNC: NORMAL
SP GR UR STRIP: 1.01

## 2025-06-05 PROCEDURE — 52000 CYSTOURETHROSCOPY: CPT

## 2025-06-05 PROCEDURE — 81003 URINALYSIS AUTO W/O SCOPE: CPT | Mod: QW

## 2025-06-05 RX ORDER — VIBEGRON 75 MG/1
75 TABLET, FILM COATED ORAL
Qty: 90 | Refills: 3 | Status: ACTIVE | COMMUNITY
Start: 2025-06-05 | End: 1900-01-01

## 2025-06-07 LAB — BACTERIA UR CULT: NORMAL

## 2025-08-12 ENCOUNTER — NON-APPOINTMENT (OUTPATIENT)
Age: 70
End: 2025-08-12

## 2025-08-12 RX ORDER — MIRABEGRON 25 MG/1
25 TABLET, EXTENDED RELEASE ORAL
Qty: 30 | Refills: 2 | Status: ACTIVE | COMMUNITY
Start: 2025-08-12 | End: 1900-01-01

## 2025-08-27 ENCOUNTER — APPOINTMENT (OUTPATIENT)
Dept: RADIOLOGY | Facility: CLINIC | Age: 70
End: 2025-08-27

## 2025-08-27 ENCOUNTER — NON-APPOINTMENT (OUTPATIENT)
Age: 70
End: 2025-08-27

## 2025-08-27 ENCOUNTER — APPOINTMENT (OUTPATIENT)
Dept: ORTHOPEDIC SURGERY | Facility: CLINIC | Age: 70
End: 2025-08-27

## 2025-08-27 ENCOUNTER — OUTPATIENT (OUTPATIENT)
Dept: OUTPATIENT SERVICES | Facility: HOSPITAL | Age: 70
LOS: 1 days | End: 2025-08-27
Payer: MEDICARE

## 2025-08-27 VITALS
WEIGHT: 109 LBS | OXYGEN SATURATION: 98 % | HEIGHT: 62 IN | BODY MASS INDEX: 20.06 KG/M2 | SYSTOLIC BLOOD PRESSURE: 139 MMHG | HEART RATE: 65 BPM | DIASTOLIC BLOOD PRESSURE: 88 MMHG

## 2025-08-27 DIAGNOSIS — Z96.652 PRESENCE OF LEFT ARTIFICIAL KNEE JOINT: ICD-10-CM

## 2025-08-27 DIAGNOSIS — Z96.652 AFTERCARE FOLLOWING JOINT REPLACEMENT SURGERY: ICD-10-CM

## 2025-08-27 DIAGNOSIS — Z47.1 AFTERCARE FOLLOWING JOINT REPLACEMENT SURGERY: ICD-10-CM

## 2025-08-27 PROCEDURE — 77073 BONE LENGTH STUDIES: CPT | Mod: 26

## 2025-08-27 PROCEDURE — 73562 X-RAY EXAM OF KNEE 3: CPT

## 2025-08-27 PROCEDURE — 99213 OFFICE O/P EST LOW 20 MIN: CPT

## 2025-08-27 PROCEDURE — 73562 X-RAY EXAM OF KNEE 3: CPT | Mod: 26,LT

## 2025-08-27 PROCEDURE — 77073 BONE LENGTH STUDIES: CPT

## 2025-09-10 ENCOUNTER — NON-APPOINTMENT (OUTPATIENT)
Age: 70
End: 2025-09-10

## 2025-09-18 ENCOUNTER — NON-APPOINTMENT (OUTPATIENT)
Age: 70
End: 2025-09-18